# Patient Record
Sex: FEMALE | Race: WHITE | NOT HISPANIC OR LATINO | RURAL
[De-identification: names, ages, dates, MRNs, and addresses within clinical notes are randomized per-mention and may not be internally consistent; named-entity substitution may affect disease eponyms.]

---

## 2019-04-19 VITALS — HEIGHT: 62 IN

## 2019-04-19 RX ORDER — CHLORTHALIDONE 25 MG/1
25 TABLET ORAL DAILY
COMMUNITY
End: 2019-04-22 | Stop reason: ALTCHOICE

## 2019-04-19 RX ORDER — OMEPRAZOLE 20 MG/1
20 CAPSULE, DELAYED RELEASE ORAL DAILY
COMMUNITY
End: 2019-04-22 | Stop reason: ALTCHOICE

## 2019-04-19 RX ORDER — PRAMIPEXOLE DIHYDROCHLORIDE 0.25 MG/1
0.25 TABLET ORAL 3 TIMES DAILY
COMMUNITY
End: 2019-04-22 | Stop reason: ALTCHOICE

## 2019-04-19 RX ORDER — ARFORMOTEROL TARTRATE 15 UG/2ML
SOLUTION RESPIRATORY (INHALATION)
COMMUNITY
End: 2019-04-22 | Stop reason: ALTCHOICE

## 2019-04-19 RX ORDER — BUDESONIDE 3 MG/1
6 CAPSULE, COATED PELLETS ORAL EVERY MORNING
COMMUNITY
End: 2019-04-22 | Stop reason: ALTCHOICE

## 2019-04-19 RX ORDER — OXYCODONE HYDROCHLORIDE 30 MG/1
30 TABLET, FILM COATED, EXTENDED RELEASE ORAL EVERY 12 HOURS SCHEDULED
COMMUNITY
End: 2019-04-22 | Stop reason: ALTCHOICE

## 2019-04-19 RX ORDER — LISINOPRIL 40 MG/1
40 TABLET ORAL DAILY
COMMUNITY

## 2019-04-19 RX ORDER — ALBUTEROL SULFATE 90 UG/1
2 AEROSOL, METERED RESPIRATORY (INHALATION) EVERY 4 HOURS PRN
COMMUNITY
End: 2019-04-22 | Stop reason: ALTCHOICE

## 2019-04-19 RX ORDER — PROMETHAZINE HYDROCHLORIDE 25 MG/1
25 TABLET ORAL EVERY 6 HOURS PRN
COMMUNITY
End: 2019-04-22 | Stop reason: ALTCHOICE

## 2019-04-19 RX ORDER — BUSPIRONE HYDROCHLORIDE 15 MG/1
15 TABLET ORAL 3 TIMES DAILY
COMMUNITY
End: 2019-04-22 | Stop reason: ALTCHOICE

## 2019-04-19 RX ORDER — LEVOTHYROXINE SODIUM 0.03 MG/1
25 TABLET ORAL DAILY
COMMUNITY
End: 2019-04-22 | Stop reason: ALTCHOICE

## 2019-04-19 RX ORDER — AMLODIPINE BESYLATE AND ATORVASTATIN CALCIUM 10; 10 MG/1; MG/1
1 TABLET, FILM COATED ORAL DAILY
COMMUNITY
End: 2019-04-22 | Stop reason: ALTCHOICE

## 2019-04-19 RX ORDER — PENTOXIFYLLINE 400 MG/1
400 TABLET, EXTENDED RELEASE ORAL
COMMUNITY
End: 2019-04-22 | Stop reason: ALTCHOICE

## 2019-04-19 RX ORDER — BUDESONIDE 0.5 MG/2ML
0.5 INHALANT ORAL 2 TIMES DAILY
COMMUNITY
End: 2019-04-22 | Stop reason: ALTCHOICE

## 2019-04-22 ENCOUNTER — OFFICE VISIT (OUTPATIENT)
Dept: CARDIOLOGY | Facility: CLINIC | Age: 59
End: 2019-04-22

## 2019-04-22 VITALS
SYSTOLIC BLOOD PRESSURE: 118 MMHG | WEIGHT: 149 LBS | HEIGHT: 62 IN | BODY MASS INDEX: 27.42 KG/M2 | HEART RATE: 57 BPM | DIASTOLIC BLOOD PRESSURE: 68 MMHG

## 2019-04-22 DIAGNOSIS — R94.31 ABNORMAL EKG: Primary | ICD-10-CM

## 2019-04-22 DIAGNOSIS — Z79.4 TYPE 2 DIABETES MELLITUS WITH COMPLICATION, WITH LONG-TERM CURRENT USE OF INSULIN (HCC): ICD-10-CM

## 2019-04-22 DIAGNOSIS — I10 ESSENTIAL HYPERTENSION: ICD-10-CM

## 2019-04-22 DIAGNOSIS — E78.5 HYPERLIPIDEMIA LDL GOAL <70: ICD-10-CM

## 2019-04-22 DIAGNOSIS — Z86.73 HISTORY OF TIA (TRANSIENT ISCHEMIC ATTACK): ICD-10-CM

## 2019-04-22 DIAGNOSIS — E11.8 TYPE 2 DIABETES MELLITUS WITH COMPLICATION, WITH LONG-TERM CURRENT USE OF INSULIN (HCC): ICD-10-CM

## 2019-04-22 PROCEDURE — 93000 ELECTROCARDIOGRAM COMPLETE: CPT | Performed by: INTERNAL MEDICINE

## 2019-04-22 PROCEDURE — 99204 OFFICE O/P NEW MOD 45 MIN: CPT | Performed by: INTERNAL MEDICINE

## 2019-04-22 RX ORDER — CLOPIDOGREL BISULFATE 75 MG/1
1 TABLET ORAL DAILY
Refills: 0 | COMMUNITY
Start: 2019-02-22

## 2019-04-22 RX ORDER — LAMOTRIGINE 100 MG/1
2.5 TABLET ORAL NIGHTLY
Refills: 0 | COMMUNITY
Start: 2019-03-25

## 2019-04-22 RX ORDER — BACLOFEN 10 MG/1
1 TABLET ORAL EVERY 8 HOURS
Refills: 1 | COMMUNITY
Start: 2019-03-10

## 2019-04-22 RX ORDER — ATORVASTATIN CALCIUM 20 MG/1
1 TABLET, FILM COATED ORAL DAILY
Refills: 1 | COMMUNITY
Start: 2019-03-05

## 2019-04-22 RX ORDER — FUROSEMIDE 20 MG/1
1 TABLET ORAL DAILY
Refills: 1 | COMMUNITY
Start: 2019-03-18

## 2019-04-22 RX ORDER — CYANOCOBALAMIN 1000 UG/ML
1 INJECTION, SOLUTION INTRAMUSCULAR; SUBCUTANEOUS
Refills: 5 | COMMUNITY
Start: 2019-03-30

## 2019-04-22 RX ORDER — CLORAZEPATE DIPOTASSIUM 7.5 MG/1
TABLET ORAL
Refills: 0 | COMMUNITY
Start: 2019-03-08

## 2019-04-22 RX ORDER — COVID-19 ANTIGEN TEST
1 KIT MISCELLANEOUS DAILY
Refills: 3 | COMMUNITY
Start: 2019-04-13

## 2019-04-22 RX ORDER — DESVENLAFAXINE 100 MG/1
1 TABLET, EXTENDED RELEASE ORAL DAILY
Refills: 1 | COMMUNITY
Start: 2019-03-05

## 2019-04-22 RX ORDER — INSULIN GLARGINE 100 [IU]/ML
INJECTION, SOLUTION SUBCUTANEOUS
Refills: 1 | COMMUNITY
Start: 2019-02-19

## 2019-04-22 RX ORDER — IBANDRONATE SODIUM 150 MG/1
1 TABLET, FILM COATED ORAL
Refills: 1 | COMMUNITY
Start: 2019-04-01

## 2019-04-22 RX ORDER — GABAPENTIN 600 MG/1
1 TABLET ORAL 3 TIMES DAILY
Refills: 2 | COMMUNITY
Start: 2019-03-31

## 2019-04-22 RX ORDER — ERGOCALCIFEROL 1.25 MG/1
1 CAPSULE ORAL WEEKLY
Refills: 1 | COMMUNITY
Start: 2019-02-14

## 2019-04-22 RX ORDER — DIAZEPAM 5 MG/1
1 TABLET ORAL EVERY 8 HOURS PRN
Refills: 0 | COMMUNITY
Start: 2019-02-24

## 2019-04-22 RX ORDER — OXYCODONE 36 MG/1
1 CAPSULE, EXTENDED RELEASE ORAL 2 TIMES DAILY
Refills: 0 | COMMUNITY
Start: 2019-03-28

## 2019-04-22 RX ORDER — LEVOTHYROXINE SODIUM 88 UG/1
88 TABLET ORAL DAILY
Refills: 0 | COMMUNITY
Start: 2019-03-11

## 2019-04-22 RX ORDER — AMLODIPINE BESYLATE 5 MG/1
1 TABLET ORAL DAILY
Refills: 0 | COMMUNITY
Start: 2019-02-09

## 2019-04-22 RX ORDER — ESOMEPRAZOLE MAGNESIUM 40 MG/1
1 CAPSULE, DELAYED RELEASE ORAL DAILY
Refills: 1 | COMMUNITY
Start: 2019-03-05

## 2019-04-22 RX ORDER — OXYCODONE HYDROCHLORIDE 15 MG/1
1 TABLET ORAL 4 TIMES DAILY
Refills: 0 | COMMUNITY
Start: 2019-04-13

## 2019-04-22 RX ORDER — TRAZODONE HYDROCHLORIDE 50 MG/1
TABLET ORAL
Refills: 3 | COMMUNITY
Start: 2019-02-13

## 2019-04-22 RX ORDER — INSULIN ASPART 100 [IU]/ML
INJECTION, SOLUTION INTRAVENOUS; SUBCUTANEOUS
Refills: 0 | COMMUNITY
Start: 2019-03-05

## 2019-04-22 NOTE — PROGRESS NOTES
Starr Ardon  1960  Date of Office Visit: 04/22/2019  Encounter Provider: Anurag Gonzalez MD  Place of Service: [facility] CARDIOLOGY      CHIEF COMPLAINT:  Abnormal EKG       HISTORY OF PRESENT ILLNESS:The patient is a 58-year-old female with no significant cardiovascular history who presents to me for an abnormal electrocardiogram.  The patient has gastroesophageal reflux disease, diabetes, hypertension, hyperlipidemia, and prior transient ischemic attack.  She is on aspirin and Plavix therapy.  She is also on atorvastatin.  She has not previously had a myocardial infarction or any prior coronary angiography.  She had an electrocardiogram done routinely, which I have reviewed showing sinus rhythm with an intraventricular conduction delay and poor R-wave progression.  It was read as a septal myocardial infarction.  Her electrocardiogram here today is similar showing sinus with an intraventricular conduction delay and delayed R-wave progression.         Review of Systems   Constitution: Negative for fever, weakness and malaise/fatigue.   HENT: Negative for nosebleeds and sore throat.    Eyes: Negative for blurred vision and double vision.   Cardiovascular: Negative for chest pain, claudication, palpitations and syncope.   Respiratory: Negative for cough, shortness of breath and snoring.    Endocrine: Negative for cold intolerance, heat intolerance and polydipsia.   Skin: Negative for itching, poor wound healing and rash.   Musculoskeletal: Negative for joint pain, joint swelling, muscle weakness and myalgias.   Gastrointestinal: Negative for abdominal pain, melena, nausea and vomiting.   Neurological: Negative for light-headedness, loss of balance, seizures and vertigo.   Psychiatric/Behavioral: Negative for altered mental status and depression.       Past Medical History:   Diagnosis Date   • Diabetes mellitus (CMS/[facility])    • Hyperlipidemia    • Hypertension    •  Osteoporosis    • Stroke (CMS/HCC)        The following portions of the patient's history were reviewed and updated as appropriate: Social history , Family history and Surgical history     Current Outpatient Medications on File Prior to Visit   Medication Sig Dispense Refill   • amLODIPine (NORVASC) 5 MG tablet Take 1 tablet by mouth Daily.  0   • aspirin 81 MG tablet Take 81 mg by mouth Daily.     • atorvastatin (LIPITOR) 20 MG tablet Take 1 tablet by mouth Daily.  1   • baclofen (LIORESAL) 10 MG tablet Take 1 tablet by mouth Every 8 (Eight) Hours.  1   • Calcium Carb-Cholecalciferol (CALCIUM-VITAMIN D) 600-400 MG-UNIT tablet Take 1 tablet by mouth Daily.  1   • Cholecalciferol (VITAMIN D3) 5000 units capsule capsule Take 1 capsule by mouth Daily.  1   • clopidogrel (PLAVIX) 75 MG tablet Take 1 tablet by mouth Daily.  0   • clorazepate (TRANXENE) 7.5 MG tablet   0   • cyanocobalamin 1000 MCG/ML injection Inject 1 mL into the appropriate muscle as directed by prescriber Every 30 (Thirty) Days.  5   • desvenlafaxine (PRISTIQ) 100 MG 24 hr tablet Take 1 tablet by mouth Daily.  1   • diazePAM (VALIUM) 5 MG tablet Take 1 tablet by mouth Every 8 (Eight) Hours As Needed.  0   • esomeprazole (nexIUM) 40 MG capsule Take 1 capsule by mouth Daily.  1   • furosemide (LASIX) 20 MG tablet Take 1 tablet by mouth Daily.  1   • gabapentin (NEURONTIN) 600 MG tablet Take 1 tablet by mouth 3 (Three) Times a Day.  2   • ibandronate (BONIVA) 150 MG tablet Take 1 tablet by mouth Every 30 (Thirty) Days.  1   • lamoTRIgine (LaMICtal) 100 MG tablet Take 2.5 tablets by mouth Every Night.  0   • LANTUS SOLOSTAR 100 UNIT/ML injection pen INJECT 10 UNITS SC IN THE MORNING AND 30 UNITS IN THE EVENING  1   • levothyroxine (SYNTHROID, LEVOTHROID) 88 MCG tablet Take 88 mcg by mouth Daily.  0   • lisinopril (PRINIVIL,ZESTRIL) 40 MG tablet Take 40 mg by mouth Daily.     • MAGNESIUM-OXIDE 400 (241.3 Mg) MG tablet tablet Take 1 tablet by mouth Daily.   1   • NOVOLOG FLEXPEN 100 UNIT/ML solution pen-injector sc pen INJECT SC BID PER SLIDING SCALE FOR A MAX OF  20 UNITS DAILY  0   • oxaprozin (DAYPRO) 600 MG tablet Take 1 tablet by mouth 2 (Two) Times a Day.  3   • oxyCODONE (ROXICODONE) 15 MG immediate release tablet Take 1 tablet by mouth 4 (Four) Times a Day.  0   • traZODone (DESYREL) 50 MG tablet TK 1 TO 2 TS PO HS PRN  3   • vitamin D (ERGOCALCIFEROL) 34303 units capsule capsule Take 1 capsule by mouth 1 (One) Time Per Week.  1   • WAL-FEX ALLERGY 180 MG tablet Take 1 tablet by mouth Daily.  3   • XTAMPZA ER 36 MG capsule extended-release 12 hour  Take 1 tablet by mouth 2 (Two) Times a Day.  0   • [DISCONTINUED] albuterol sulfate  (90 Base) MCG/ACT inhaler Inhale 2 puffs Every 4 (Four) Hours As Needed for Wheezing.     • [DISCONTINUED] amLODIPine-atorvastatin (CADUET) 10-10 MG per tablet Take 1 tablet by mouth Daily.     • [DISCONTINUED] arformoterol (BROVANA) 15 MCG/2ML nebulizer solution Take  by nebulization 2 (Two) Times a Day.     • [DISCONTINUED] Budesonide (ENTOCORT EC) 3 MG 24 hr capsule Take 6 mg by mouth Every Morning.     • [DISCONTINUED] budesonide (PULMICORT) 0.5 MG/2ML nebulizer solution Take 0.5 mg by nebulization 2 (Two) Times a Day.     • [DISCONTINUED] busPIRone (BUSPAR) 15 MG tablet Take 15 mg by mouth 3 (Three) Times a Day.     • [DISCONTINUED] chlorthalidone (HYGROTON) 25 MG tablet Take 25 mg by mouth Daily. TAKE 1/2 TAB DAILY     • [DISCONTINUED] dronedarone (MULTAQ) 400 MG tablet Take 400 mg by mouth 2 (Two) Times a Day With Meals.     • [DISCONTINUED] fluticasone (FLOVENT DISKUS) 50 MCG/BLIST diskus inhaler Inhale 1 puff Daily.     • [DISCONTINUED] insulin aspart (novoLOG) 100 UNIT/ML injection Inject 4 Units under the skin into the appropriate area as directed 3 (Three) Times a Day Before Meals.     • [DISCONTINUED] insulin detemir (LEVEMIR) 100 UNIT/ML injection Inject 20 Units under the skin into the appropriate area as  "directed Daily.     • [DISCONTINUED] levothyroxine (SYNTHROID, LEVOTHROID) 25 MCG tablet Take 25 mcg by mouth Daily.     • [DISCONTINUED] METOPROLOL TARTRATE PO Take 50 mg by mouth 3 (Three) Times a Day.     • [DISCONTINUED] omeprazole (priLOSEC) 20 MG capsule Take 20 mg by mouth Daily.     • [DISCONTINUED] oxyCODONE HCl ER (oxyCONTIN) 30 MG tablet extended-release 12 hour Take 30 mg by mouth Every 12 (Twelve) Hours.     • [DISCONTINUED] pentoxifylline (TRENtal) 400 MG CR tablet Take 400 mg by mouth 3 (Three) Times a Day With Meals.     • [DISCONTINUED] pramipexole (MIRAPEX) 0.25 MG tablet Take 0.25 mg by mouth 3 (Three) Times a Day.     • [DISCONTINUED] promethazine (PHENERGAN) 25 MG tablet Take 25 mg by mouth Every 6 (Six) Hours As Needed for Nausea or Vomiting.     • [DISCONTINUED] rivaroxaban (XARELTO) 15 MG tablet Take 15 mg by mouth Daily.     • [DISCONTINUED] tiotropium (SPIRIVA) 18 MCG per inhalation capsule Place 1 capsule into inhaler and inhale Daily.       No current facility-administered medications on file prior to visit.        Allergies   Allergen Reactions   • Levofloxacin Rash       Vitals:    04/22/19 1206   BP: 118/68   Pulse: 57   Weight: 67.6 kg (149 lb)   Height: 157.5 cm (62\")     Physical Exam   Constitutional: She is oriented to person, place, and time. She appears well-developed and well-nourished.   HENT:   Head: Normocephalic and atraumatic.   Eyes: Conjunctivae and EOM are normal. No scleral icterus.   Neck: Normal range of motion. Neck supple. Normal carotid pulses, no hepatojugular reflux and no JVD present. Carotid bruit is not present. No tracheal deviation present. No thyromegaly present.   Cardiovascular: Normal rate and regular rhythm. Exam reveals no gallop and no friction rub.   No murmur heard.  Pulses:       Carotid pulses are 2+ on the right side, and 2+ on the left side.       Radial pulses are 2+ on the right side, and 2+ on the left side.        Femoral pulses are 2+ on " the right side, and 2+ on the left side.       Dorsalis pedis pulses are 2+ on the right side, and 2+ on the left side.        Posterior tibial pulses are 2+ on the right side, and 2+ on the left side.   Pulmonary/Chest: Breath sounds normal. No respiratory distress. She has no decreased breath sounds. She has no wheezes. She has no rhonchi. She has no rales. She exhibits no tenderness.   Abdominal: Soft. Bowel sounds are normal. She exhibits no distension. There is no tenderness. There is no rebound.   Musculoskeletal: She exhibits no edema or deformity.   Neurological: She is alert and oriented to person, place, and time. She has normal strength. No sensory deficit.   Skin: No rash noted. No erythema.   Psychiatric: She has a normal mood and affect. Her behavior is normal.           No components found for: CBC  No results found for: CMP  No components found for: LIPID  No results found for: BMP      ECG 12 Lead  Date/Time: 4/22/2019 12:58 PM  Performed by: Anurag Gonzalez MD  Authorized by: Anurag Gonzalez MD   Comparison: compared with previous ECG from 2/2/2018  Similar to previous ECG  Rhythm: sinus rhythm  Rate: normal  Conduction: non-specific intraventricular conduction delay  Other findings: poor R wave progression    Clinical impression: abnormal EKG            DISCUSSION/SUMMARYPleasant 58-year-old female with a medical history of musculoskeletal pain including lumbosacral discomfort, acid reflux, diabetes, hypertension, hyperlipidemia, and prior TIA presents to me for an abnormal electrocardiogram. I have reviewed her EKG and it just shows sinus rhythm with poor R-wave progression. I do not see evidence for a myocardial infarction.    The patient is asymptomatic from a cardiac standpoint. She denies chest pain or dyspnea on exertion. I do not think a stress test is indicated.    I will get an echocardiogram just to ensure the anterior wall and septum have reasonable wall motion. If that is  the case, I would not recommend ischemic workup.